# Patient Record
Sex: FEMALE | Race: WHITE | NOT HISPANIC OR LATINO | Employment: FULL TIME | RURAL
[De-identification: names, ages, dates, MRNs, and addresses within clinical notes are randomized per-mention and may not be internally consistent; named-entity substitution may affect disease eponyms.]

---

## 2018-01-02 ENCOUNTER — HISTORICAL (OUTPATIENT)
Dept: ADMINISTRATIVE | Facility: HOSPITAL | Age: 23
End: 2018-01-02

## 2018-01-05 LAB
LAB AP CLINICAL INFORMATION: NORMAL
LAB AP GENERAL CAT - HISTORICAL: NORMAL
LAB AP INTERPRETATION/RESULT - HISTORICAL: NEGATIVE
LAB AP SPECIMEN ADEQUACY - HISTORICAL: NORMAL
LAB AP SPECIMEN SUBMITTED - HISTORICAL: NORMAL

## 2018-08-09 ENCOUNTER — HISTORICAL (OUTPATIENT)
Dept: ADMINISTRATIVE | Facility: HOSPITAL | Age: 23
End: 2018-08-09

## 2018-08-13 LAB
LAB AP GENERAL CAT - HISTORICAL: NORMAL
LAB AP INTERPRETATION/RESULT - HISTORICAL: NEGATIVE
LAB AP SPECIMEN ADEQUACY - HISTORICAL: NORMAL
LAB AP SPECIMEN SUBMITTED - HISTORICAL: NORMAL

## 2020-09-25 ENCOUNTER — HISTORICAL (OUTPATIENT)
Dept: ADMINISTRATIVE | Facility: HOSPITAL | Age: 25
End: 2020-09-25

## 2020-11-03 ENCOUNTER — HISTORICAL (OUTPATIENT)
Dept: ADMINISTRATIVE | Facility: HOSPITAL | Age: 25
End: 2020-11-03

## 2022-01-31 ENCOUNTER — OFFICE VISIT (OUTPATIENT)
Dept: FAMILY MEDICINE | Facility: CLINIC | Age: 27
End: 2022-01-31

## 2022-01-31 VITALS
WEIGHT: 110 LBS | BODY MASS INDEX: 20.77 KG/M2 | OXYGEN SATURATION: 97 % | HEIGHT: 61 IN | HEART RATE: 105 BPM | TEMPERATURE: 99 F

## 2022-01-31 DIAGNOSIS — R50.9 FEVER WITH EXPOSURE TO COVID-19 VIRUS: Primary | ICD-10-CM

## 2022-01-31 DIAGNOSIS — Z20.822 FEVER WITH EXPOSURE TO COVID-19 VIRUS: Primary | ICD-10-CM

## 2022-01-31 LAB
CTP QC/QA: YES
FLUAV AG NPH QL: NEGATIVE
FLUBV AG NPH QL: NEGATIVE
SARS-COV-2 AG RESP QL IA.RAPID: NEGATIVE

## 2022-01-31 PROCEDURE — 99203 PR OFFICE/OUTPT VISIT, NEW, LEVL III, 30-44 MIN: ICD-10-PCS | Mod: GC,,, | Performed by: SPECIALIST

## 2022-01-31 PROCEDURE — 99203 OFFICE O/P NEW LOW 30 MIN: CPT | Mod: GC,,, | Performed by: SPECIALIST

## 2022-01-31 PROCEDURE — 87428 SARSCOV & INF VIR A&B AG IA: CPT | Mod: QW,GC,, | Performed by: SPECIALIST

## 2022-01-31 PROCEDURE — 87428 POCT SARS-COV2 (COVID) WITH FLU ANTIGEN: ICD-10-PCS | Mod: QW,GC,, | Performed by: SPECIALIST

## 2022-01-31 NOTE — PROGRESS NOTES
"  Subjective:       Patient ID: Malinda Sepulveda is a 26 y.o. female.    Chief Complaint: No chief complaint on file.      26 y.o. patient who presents to [facility] for COVID-19 testing. Patient is symptomatic since yesterday, 1/30/22. Symptoms include fever, congestion, sore throat and headaches. Patient has no known exposure. . Patient vapes. Patient denies significant medical history. Patient has not received the COVID-19 vaccine. Patient has been previously diagnosed with COVID-19, August 2021.    Review of Systems   Constitutional: Positive for fatigue. Negative for fever.   HENT: Positive for nasal congestion and sore throat.    Respiratory: Negative for cough and shortness of breath.    Cardiovascular: Negative for chest pain.   Gastrointestinal: Negative for abdominal pain, diarrhea, nausea and vomiting.   Genitourinary: Negative for dysuria.   Musculoskeletal: Negative for myalgias.   Integumentary:  Negative for rash.   Neurological: Positive for headaches. Negative for dizziness and weakness.   Psychiatric/Behavioral: Negative for confusion.   All other systems reviewed and are negative.          Objective:        Pulse 105   Temp 98.8 °F (37.1 °C)   Ht 5' 1" (1.549 m)   Wt 49.9 kg (110 lb)   SpO2 97%   BMI 20.78 kg/m²     Physical Exam  Constitutional:       General: She is not in acute distress.     Appearance: Normal appearance. She is not ill-appearing.   HENT:      Head: Normocephalic and atraumatic.      Nose: Nose normal.   Eyes:      Conjunctiva/sclera: Conjunctivae normal.      Pupils: Pupils are equal, round, and reactive to light.   Pulmonary:      Effort: Pulmonary effort is normal. No respiratory distress.   Musculoskeletal:      Cervical back: No rigidity.   Skin:     Coloration: Skin is not jaundiced or pale.      Findings: No rash.   Neurological:      Mental Status: She is alert.   Psychiatric:         Behavior: Behavior normal.         Thought Content: Thought content normal.         "   Assessment:       1. Fever with exposure to COVID-19 virus        Plan:         Problem List Items Addressed This Visit        Other    Fever with exposure to COVID-19 virus - Primary     Instructions given for isolation, home care, and monitoring.            Relevant Orders    POCT SARS-COV2 (COVID) with Flu Antigen           Follow up if symptoms worsen or fail to improve.

## 2023-04-05 ENCOUNTER — OFFICE VISIT (OUTPATIENT)
Dept: FAMILY MEDICINE | Facility: CLINIC | Age: 28
End: 2023-04-05
Payer: COMMERCIAL

## 2023-04-05 VITALS
OXYGEN SATURATION: 96 % | SYSTOLIC BLOOD PRESSURE: 110 MMHG | HEART RATE: 54 BPM | DIASTOLIC BLOOD PRESSURE: 74 MMHG | RESPIRATION RATE: 18 BRPM | HEIGHT: 61 IN | TEMPERATURE: 98 F | BODY MASS INDEX: 20.82 KG/M2 | WEIGHT: 110.25 LBS

## 2023-04-05 DIAGNOSIS — H92.09 OTALGIA, UNSPECIFIED LATERALITY: Primary | ICD-10-CM

## 2023-04-05 PROCEDURE — 1159F MED LIST DOCD IN RCRD: CPT | Mod: CPTII,,, | Performed by: NURSE PRACTITIONER

## 2023-04-05 PROCEDURE — 1159F PR MEDICATION LIST DOCUMENTED IN MEDICAL RECORD: ICD-10-PCS | Mod: CPTII,,, | Performed by: NURSE PRACTITIONER

## 2023-04-05 PROCEDURE — 1160F RVW MEDS BY RX/DR IN RCRD: CPT | Mod: CPTII,,, | Performed by: NURSE PRACTITIONER

## 2023-04-05 PROCEDURE — 99203 PR OFFICE/OUTPT VISIT, NEW, LEVL III, 30-44 MIN: ICD-10-PCS | Mod: ,,, | Performed by: NURSE PRACTITIONER

## 2023-04-05 PROCEDURE — 3008F PR BODY MASS INDEX (BMI) DOCUMENTED: ICD-10-PCS | Mod: CPTII,,, | Performed by: NURSE PRACTITIONER

## 2023-04-05 PROCEDURE — 1160F PR REVIEW ALL MEDS BY PRESCRIBER/CLIN PHARMACIST DOCUMENTED: ICD-10-PCS | Mod: CPTII,,, | Performed by: NURSE PRACTITIONER

## 2023-04-05 PROCEDURE — 3074F SYST BP LT 130 MM HG: CPT | Mod: CPTII,,, | Performed by: NURSE PRACTITIONER

## 2023-04-05 PROCEDURE — 3074F PR MOST RECENT SYSTOLIC BLOOD PRESSURE < 130 MM HG: ICD-10-PCS | Mod: CPTII,,, | Performed by: NURSE PRACTITIONER

## 2023-04-05 PROCEDURE — 99203 OFFICE O/P NEW LOW 30 MIN: CPT | Mod: ,,, | Performed by: NURSE PRACTITIONER

## 2023-04-05 PROCEDURE — 3078F DIAST BP <80 MM HG: CPT | Mod: CPTII,,, | Performed by: NURSE PRACTITIONER

## 2023-04-05 PROCEDURE — 3008F BODY MASS INDEX DOCD: CPT | Mod: CPTII,,, | Performed by: NURSE PRACTITIONER

## 2023-04-05 PROCEDURE — 3078F PR MOST RECENT DIASTOLIC BLOOD PRESSURE < 80 MM HG: ICD-10-PCS | Mod: CPTII,,, | Performed by: NURSE PRACTITIONER

## 2023-04-05 RX ORDER — IBUPROFEN 600 MG/1
600 TABLET ORAL 3 TIMES DAILY
Qty: 30 TABLET | Refills: 1 | Status: SHIPPED | OUTPATIENT
Start: 2023-04-05 | End: 2023-10-04

## 2023-04-05 RX ORDER — CLOBETASOL PROPIONATE 0.5 MG/G
AEROSOL, FOAM TOPICAL
COMMUNITY
Start: 2022-11-29

## 2023-04-05 RX ORDER — KETOCONAZOLE 20 MG/ML
SHAMPOO, SUSPENSION TOPICAL
COMMUNITY
Start: 2023-01-17 | End: 2023-11-08

## 2023-04-05 NOTE — LETTER
April 5, 2023      [facility]  [address] [place] [address]  Phone: [phone]  Fax: [phone]       Patient: Malinda Sepulveda   YOB: 1995  Date of Visit: 04/05/2023    To Whom It May Concern:    Victoriano Sepulveda  was at [facility] on 04/05/2023. The patient may return to work/school on 4/5/2023 with no restrictions. If you have any questions or concerns, or if I can be of further assistance, please do not hesitate to contact me.    Sincerely,    THAD Disla

## 2023-04-05 NOTE — PROGRESS NOTES
Emely Hanson, THAD   [facility]      PATIENT NAME: Malinda Sepulveda  : 1995  DATE: 23  MRN: [id number]      Patient PCP Information       Provider PCP Type    Primary Doctor No General            Reason for Visit / Chief Complaint: Otalgia (Right/Room 1///)           History of Present Illness / Problem Focused Workflow     Malinda Sepulveda presents to the clinic with Otalgia (Right/Room 1///)     HPI  Patient with right ear pain for last year. Intermittent. Has become more frequent.   Patient states sound muffled at times. Pain aching and sharp in ear. No ringing in ear.   Hx of softball injury as chid with jaw fracture. Plate midline  mandible per patient. Was hit on right side.   No known hx of tmj    Review of Systems     Review of Systems   Constitutional:  Negative for activity change, appetite change, chills, diaphoresis, fatigue, fever and unexpected weight change.   HENT:  Positive for ear pain. Negative for congestion, facial swelling, hearing loss, nosebleeds and sore throat.    Eyes: Negative.    Respiratory:  Negative for apnea, cough, shortness of breath and wheezing.    Cardiovascular:  Negative for chest pain, palpitations and leg swelling.   Gastrointestinal:  Negative for abdominal distention, abdominal pain, blood in stool, constipation, diarrhea and nausea.   Endocrine: Negative for cold intolerance, heat intolerance, polydipsia, polyphagia and polyuria.   Genitourinary:  Negative for decreased urine volume, difficulty urinating, dysuria, flank pain, frequency, hematuria and urgency.   Musculoskeletal:  Negative for arthralgias, joint swelling and myalgias.   Skin:  Negative for color change and rash.   Allergic/Immunologic: Negative.    Neurological:  Negative for dizziness, tremors, seizures, syncope, facial asymmetry, speech difficulty, weakness, light-headedness, numbness and headaches.   Hematological:  Negative for adenopathy. Does not bruise/bleed easily.  "  Psychiatric/Behavioral:  Negative for behavioral problems and confusion.      Medical / Social / Family History   History reviewed. No pertinent past medical history.    History reviewed. No pertinent surgical history.    Social History  Ms.  reports that she has been smoking vaping with nicotine. She has never used smokeless tobacco. She reports current alcohol use. She reports that she does not use drugs.    Family History  Ms.'s family history is not on file.    Medications and Allergies     Medications  Outpatient Medications Marked as Taking for the 4/5/23 encounter (Office Visit) with THAD Disla   Medication Sig Dispense Refill    clobetasoL (OLUX) 0.05 % Foam Apply topically.      ketoconazole (NIZORAL) 2 % shampoo SMARTSIG:Topical 2-3 Times Weekly         Allergies  Review of patient's allergies indicates:  No Known Allergies    Physical Examination     Vitals:    04/05/23 0944   BP: 110/74   BP Location: Right arm   Patient Position: Sitting   Pulse: (!) 54   Resp: 18   Temp: 98.1 °F (36.7 °C)   SpO2: 96%   Weight: 50 kg (110 lb 4 oz)   Height: 5' 1" (1.549 m)       Physical Exam  Vitals and nursing note reviewed.   Constitutional:       Appearance: Normal appearance. She is normal weight.   HENT:      Head: Normocephalic.      Right Ear: Tympanic membrane, ear canal and external ear normal.      Left Ear: Tympanic membrane, ear canal and external ear normal.      Ears:      Comments: Mild crepitus at TMJ with open and closing mouth     Nose: Nose normal.      Mouth/Throat:      Mouth: Mucous membranes are moist.   Eyes:      Extraocular Movements: Extraocular movements intact.      Conjunctiva/sclera: Conjunctivae normal.      Pupils: Pupils are equal, round, and reactive to light.   Cardiovascular:      Rate and Rhythm: Normal rate and regular rhythm.      Pulses: Normal pulses.      Heart sounds: Normal heart sounds. No murmur heard.  Pulmonary:      Effort: Pulmonary effort is normal.      " Breath sounds: Normal breath sounds. No stridor. No wheezing or rhonchi.   Abdominal:      General: Bowel sounds are normal. There is no distension.      Palpations: Abdomen is soft. There is no mass.      Tenderness: There is no abdominal tenderness.   Musculoskeletal:         General: No swelling or tenderness. Normal range of motion.      Cervical back: Normal range of motion and neck supple.      Right lower leg: No edema.      Left lower leg: No edema.   Skin:     General: Skin is warm and dry.      Capillary Refill: Capillary refill takes less than 2 seconds.   Neurological:      General: No focal deficit present.      Mental Status: She is alert. Mental status is at baseline.      Cranial Nerves: No cranial nerve deficit.      Sensory: No sensory deficit.      Motor: No weakness.   Psychiatric:         Mood and Affect: Mood normal.         Behavior: Behavior normal.         Thought Content: Thought content normal.         Judgment: Judgment normal.         No visits with results within 14 Day(s) from this visit.   Latest known visit with results is:   Office Visit on 01/31/2022   Component Date Value Ref Range Status    SARS Coronavirus 2 Antigen 01/31/2022 Negative  Negative Final    Rapid Influenza A Ag 01/31/2022 Negative  Negative Final    Rapid Influenza B Ag 01/31/2022 Negative  Negative Final     Acceptable 01/31/2022 Yes   Final             Assessment and Plan (including Health Maintenance)       Plan:   Otalgia, unspecified laterality  -     ibuprofen (ADVIL,MOTRIN) 600 MG tablet; Take 1 tablet (600 mg total) by mouth 3 (three) times daily.  Dispense: 30 tablet; Refill: 1     Possible TMJ  Take nsaid as prescribed , follow up 1 mo for recheck  There are no Patient Instructions on file for this visit.       Health Maintenance Due   Topic Date Due    Hepatitis C Screening  Never done    Lipid Panel  Never done    COVID-19 Vaccine (1) Never done    Pneumococcal Vaccines (Age 0-64) (1 -  PCV) Never done    HIV Screening  Never done    TETANUS VACCINE  Never done    Pap Smear  Never done    Influenza Vaccine (1) Never done         There is no immunization history on file for this patient.     Problem List Items Addressed This Visit    None  Visit Diagnoses       Otalgia, unspecified laterality    -  Primary    Relevant Medications    ibuprofen (ADVIL,MOTRIN) 600 MG tablet            The patient has no Health Maintenance topics of status Not Due    Future Appointments   Date Time Provider Department Center   5/5/2023  7:45 AM THAD Disla [facility] G[facility]            Signature:  THAD Disla  [facility]     Date of encounter: 4/5/23

## 2023-05-05 ENCOUNTER — OFFICE VISIT (OUTPATIENT)
Dept: FAMILY MEDICINE | Facility: CLINIC | Age: 28
End: 2023-05-05
Payer: COMMERCIAL

## 2023-05-05 VITALS
TEMPERATURE: 99 F | RESPIRATION RATE: 20 BRPM | DIASTOLIC BLOOD PRESSURE: 82 MMHG | HEIGHT: 61 IN | WEIGHT: 120 LBS | SYSTOLIC BLOOD PRESSURE: 123 MMHG | BODY MASS INDEX: 22.66 KG/M2 | HEART RATE: 84 BPM

## 2023-05-05 DIAGNOSIS — R05.9 COUGH, UNSPECIFIED TYPE: Primary | ICD-10-CM

## 2023-05-05 DIAGNOSIS — Z87.09 HISTORY OF STREP SORE THROAT: ICD-10-CM

## 2023-05-05 PROCEDURE — 3008F BODY MASS INDEX DOCD: CPT | Mod: CPTII,,, | Performed by: NURSE PRACTITIONER

## 2023-05-05 PROCEDURE — 99212 PR OFFICE/OUTPT VISIT, EST, LEVL II, 10-19 MIN: ICD-10-PCS | Mod: ,,, | Performed by: NURSE PRACTITIONER

## 2023-05-05 PROCEDURE — 3008F PR BODY MASS INDEX (BMI) DOCUMENTED: ICD-10-PCS | Mod: CPTII,,, | Performed by: NURSE PRACTITIONER

## 2023-05-05 PROCEDURE — 1160F PR REVIEW ALL MEDS BY PRESCRIBER/CLIN PHARMACIST DOCUMENTED: ICD-10-PCS | Mod: CPTII,,, | Performed by: NURSE PRACTITIONER

## 2023-05-05 PROCEDURE — 3074F PR MOST RECENT SYSTOLIC BLOOD PRESSURE < 130 MM HG: ICD-10-PCS | Mod: CPTII,,, | Performed by: NURSE PRACTITIONER

## 2023-05-05 PROCEDURE — 99212 OFFICE O/P EST SF 10 MIN: CPT | Mod: ,,, | Performed by: NURSE PRACTITIONER

## 2023-05-05 PROCEDURE — 3079F PR MOST RECENT DIASTOLIC BLOOD PRESSURE 80-89 MM HG: ICD-10-PCS | Mod: CPTII,,, | Performed by: NURSE PRACTITIONER

## 2023-05-05 PROCEDURE — 1160F RVW MEDS BY RX/DR IN RCRD: CPT | Mod: CPTII,,, | Performed by: NURSE PRACTITIONER

## 2023-05-05 PROCEDURE — 87081 CULTURE, STREP A,  THROAT: ICD-10-PCS | Mod: ,,, | Performed by: CLINICAL MEDICAL LABORATORY

## 2023-05-05 PROCEDURE — 1159F PR MEDICATION LIST DOCUMENTED IN MEDICAL RECORD: ICD-10-PCS | Mod: CPTII,,, | Performed by: NURSE PRACTITIONER

## 2023-05-05 PROCEDURE — 1159F MED LIST DOCD IN RCRD: CPT | Mod: CPTII,,, | Performed by: NURSE PRACTITIONER

## 2023-05-05 PROCEDURE — 3074F SYST BP LT 130 MM HG: CPT | Mod: CPTII,,, | Performed by: NURSE PRACTITIONER

## 2023-05-05 PROCEDURE — 3079F DIAST BP 80-89 MM HG: CPT | Mod: CPTII,,, | Performed by: NURSE PRACTITIONER

## 2023-05-05 PROCEDURE — 87081 CULTURE SCREEN ONLY: CPT | Mod: ,,, | Performed by: CLINICAL MEDICAL LABORATORY

## 2023-05-05 NOTE — PROGRESS NOTES
THAD Disla   [facility]      PATIENT NAME: Malinda Sepulveda  : 1995  DATE: 23  MRN: [id number]      Patient PCP Information       Provider PCP Type    Primary Doctor No General            Reason for Visit / Chief Complaint: Follow-up (Patient presents to the clinic 1m F/U just got over strep and still having a sore throat and coughing/RM2)      History of Present Illness / Problem Focused Workflow     Malinda Sepulveda presents to the clinic with Follow-up (Patient presents to the clinic 1m F/U just got over strep and still having a sore throat and coughing/RM2)     Follow-up  Pertinent negatives include no abdominal pain, arthralgias, chest pain, chills, congestion, coughing, diaphoresis, fatigue, fever, headaches, joint swelling, myalgias, nausea, numbness, rash, sore throat or weakness.   Patient presents to clinic with continue sore throat and coughing. Patient reports strep  treated with azithromycin. Patient states posterior pharynx pain improved.   However patient awoke with pain in throat lower near base of neck and cough. Patient is taking pepcid for reflux.   Patient denies fever. Patient was scheduled to follow up for ear pain as well. Prev treated with motrin for suspected TMJ. Patient does report improvement in pain with motrin.     Review of Systems     Review of Systems   Constitutional:  Negative for activity change, appetite change, chills, diaphoresis, fatigue, fever and unexpected weight change.   HENT:  Negative for congestion, ear pain, facial swelling, hearing loss, nosebleeds and sore throat.    Eyes: Negative.    Respiratory:  Negative for apnea, cough, shortness of breath and wheezing.    Cardiovascular:  Negative for chest pain, palpitations and leg swelling.   Gastrointestinal:  Negative for abdominal distention, abdominal pain, blood in stool, constipation, diarrhea and nausea.   Endocrine: Negative for cold intolerance, heat intolerance, polydipsia,  "polyphagia and polyuria.   Genitourinary:  Negative for decreased urine volume, difficulty urinating, dysuria, flank pain, frequency, hematuria and urgency.   Musculoskeletal:  Negative for arthralgias, joint swelling and myalgias.   Skin:  Negative for color change and rash.   Allergic/Immunologic: Negative.    Neurological:  Negative for dizziness, tremors, seizures, syncope, facial asymmetry, speech difficulty, weakness, light-headedness, numbness and headaches.   Hematological:  Negative for adenopathy. Does not bruise/bleed easily.   Psychiatric/Behavioral:  Negative for behavioral problems and confusion.      Medical / Social / Family History   History reviewed. No pertinent past medical history.    Past Surgical History:   Procedure Laterality Date    MANDIBLE FRACTURE SURGERY         Social History  Ms.  reports that she has been smoking vaping with nicotine. She has never used smokeless tobacco. She reports current alcohol use. She reports that she does not use drugs.    Family History  Ms.'s family history includes Cancer in her maternal grandfather; Heart failure in her maternal grandmother; Stroke in her maternal grandmother.    Medications and Allergies     Medications  No outpatient medications have been marked as taking for the 5/5/23 encounter (Office Visit) with THAD Disla.       Allergies  Review of patient's allergies indicates:  No Known Allergies    Physical Examination     Vitals:    05/05/23 0750   BP: 123/82   BP Location: Right arm   Patient Position: Sitting   BP Method: Small (Automatic)   Pulse: 84   Resp: 20   Temp: 98.5 °F (36.9 °C)   TempSrc: Oral   Weight: 54.4 kg (120 lb)   Height: 5' 1" (1.549 m)       Physical Exam  Vitals and nursing note reviewed.   Constitutional:       Appearance: Normal appearance. She is normal weight.   HENT:      Head: Normocephalic.      Right Ear: External ear normal.      Left Ear: External ear normal.      Nose: Nose normal.      " Mouth/Throat:      Mouth: Mucous membranes are moist.      Comments: Mild posterior pharynx erythema  Eyes:      Extraocular Movements: Extraocular movements intact.      Conjunctiva/sclera: Conjunctivae normal.      Pupils: Pupils are equal, round, and reactive to light.   Cardiovascular:      Rate and Rhythm: Normal rate and regular rhythm.      Pulses: Normal pulses.      Heart sounds: Normal heart sounds. No murmur heard.  Pulmonary:      Effort: Pulmonary effort is normal.      Breath sounds: Normal breath sounds. No stridor. No wheezing or rhonchi.   Abdominal:      General: Bowel sounds are normal. There is no distension.      Palpations: Abdomen is soft. There is no mass.      Tenderness: There is no abdominal tenderness.   Musculoskeletal:         General: No swelling or tenderness. Normal range of motion.      Cervical back: Normal range of motion and neck supple.      Right lower leg: No edema.      Left lower leg: No edema.   Skin:     General: Skin is warm and dry.      Capillary Refill: Capillary refill takes less than 2 seconds.   Neurological:      General: No focal deficit present.      Mental Status: She is alert and oriented to person, place, and time. Mental status is at baseline.      Cranial Nerves: No cranial nerve deficit.      Sensory: No sensory deficit.      Motor: No weakness.   Psychiatric:         Mood and Affect: Mood normal.         Behavior: Behavior normal.         Thought Content: Thought content normal.         Judgment: Judgment normal.         No visits with results within 14 Day(s) from this visit.   Latest known visit with results is:   Office Visit on 01/31/2022   Component Date Value Ref Range Status    SARS Coronavirus 2 Antigen 01/31/2022 Negative  Negative Final    Rapid Influenza A Ag 01/31/2022 Negative  Negative Final    Rapid Influenza B Ag 01/31/2022 Negative  Negative Final     Acceptable 01/31/2022 Yes   Final             Assessment and Plan  (including Health Maintenance)       Plan:   Cough, unspecified type        -   patient has tessalon at home taking prn    History of strep sore throat  -     Strep A culture, throat; Future; Expected date: 05/05/2023     Continue to monitor symptoms.    Follow up with strep culture to evaluate for clearance of infection.   RTC if symptoms worsen or fail to improve  Continue ppi for reflux  There are no Patient Instructions on file for this visit.       Health Maintenance Due   Topic Date Due    Hepatitis C Screening  Never done    Lipid Panel  Never done    COVID-19 Vaccine (1) Never done    Pneumococcal Vaccines (Age 0-64) (1 - PCV) Never done    HIV Screening  Never done    TETANUS VACCINE  Never done    Pap Smear  Never done         There is no immunization history on file for this patient.     Problem List Items Addressed This Visit    None  Visit Diagnoses       Cough, unspecified type    -  Primary    History of strep sore throat        Relevant Orders    Strep A culture, throat            Health Maintenance Topics with due status: Not Due       Topic Last Completion Date    Influenza Vaccine Not Due       No future appointments.         Signature:  THAD Disla [facility]     Date of encounter: 5/5/23

## 2023-05-07 LAB — DEPRECATED S PYO AG THROAT QL EIA: NORMAL

## 2023-09-25 ENCOUNTER — OFFICE VISIT (OUTPATIENT)
Dept: FAMILY MEDICINE | Facility: CLINIC | Age: 28
End: 2023-09-25
Payer: COMMERCIAL

## 2023-09-25 VITALS
TEMPERATURE: 98 F | HEART RATE: 50 BPM | HEIGHT: 61 IN | SYSTOLIC BLOOD PRESSURE: 108 MMHG | WEIGHT: 107.5 LBS | BODY MASS INDEX: 20.3 KG/M2 | DIASTOLIC BLOOD PRESSURE: 60 MMHG | OXYGEN SATURATION: 98 % | RESPIRATION RATE: 16 BRPM

## 2023-09-25 DIAGNOSIS — R55 NEAR SYNCOPE: Primary | ICD-10-CM

## 2023-09-25 DIAGNOSIS — N92.6 IRREGULAR MENSES: ICD-10-CM

## 2023-09-25 DIAGNOSIS — Z86.2 HISTORY OF ANEMIA: ICD-10-CM

## 2023-09-25 DIAGNOSIS — R61 DIAPHORESIS: ICD-10-CM

## 2023-09-25 LAB
B-HCG UR QL: NEGATIVE
CTP QC/QA: YES
CTP QC/QA: YES
POC (AMP) AMPHETAMINE: NEGATIVE
POC (BAR) BARBITURATES: NEGATIVE
POC (BUP) BUPRENORPHINE: NEGATIVE
POC (BZO) BENZODIAZEPINES: NEGATIVE
POC (COC) COCAINE: NEGATIVE
POC (MDMA) METHYLENEDIOXYMETHAMPHETAMINE 3,4: NEGATIVE
POC (MET) METHAMPHETAMINE: NEGATIVE
POC (MOP) OPIATES: NEGATIVE
POC (MTD) METHADONE: NEGATIVE
POC (OXY) OXYCODONE: NEGATIVE
POC (PCP) PHENCYCLIDINE: NEGATIVE
POC (TCA) TRICYCLIC ANTIDEPRESSANTS: NEGATIVE
POC TEMPERATURE (URINE): 94
POC THC: NEGATIVE

## 2023-09-25 PROCEDURE — 99214 OFFICE O/P EST MOD 30 MIN: CPT | Mod: ,,, | Performed by: NURSE PRACTITIONER

## 2023-09-25 PROCEDURE — 80305 DRUG TEST PRSMV DIR OPT OBS: CPT | Mod: QW,,, | Performed by: NURSE PRACTITIONER

## 2023-09-25 PROCEDURE — 3078F PR MOST RECENT DIASTOLIC BLOOD PRESSURE < 80 MM HG: ICD-10-PCS | Mod: CPTII,,, | Performed by: NURSE PRACTITIONER

## 2023-09-25 PROCEDURE — 3074F PR MOST RECENT SYSTOLIC BLOOD PRESSURE < 130 MM HG: ICD-10-PCS | Mod: CPTII,,, | Performed by: NURSE PRACTITIONER

## 2023-09-25 PROCEDURE — 81025 URINE PREGNANCY TEST: CPT | Mod: QW,,, | Performed by: NURSE PRACTITIONER

## 2023-09-25 PROCEDURE — 81025 POCT URINE PREGNANCY: ICD-10-PCS | Mod: QW,,, | Performed by: NURSE PRACTITIONER

## 2023-09-25 PROCEDURE — 3008F PR BODY MASS INDEX (BMI) DOCUMENTED: ICD-10-PCS | Mod: CPTII,,, | Performed by: NURSE PRACTITIONER

## 2023-09-25 PROCEDURE — 1160F PR REVIEW ALL MEDS BY PRESCRIBER/CLIN PHARMACIST DOCUMENTED: ICD-10-PCS | Mod: CPTII,,, | Performed by: NURSE PRACTITIONER

## 2023-09-25 PROCEDURE — 85025 CBC WITH DIFFERENTIAL: ICD-10-PCS | Mod: ,,, | Performed by: CLINICAL MEDICAL LABORATORY

## 2023-09-25 PROCEDURE — 3008F BODY MASS INDEX DOCD: CPT | Mod: CPTII,,, | Performed by: NURSE PRACTITIONER

## 2023-09-25 PROCEDURE — 3074F SYST BP LT 130 MM HG: CPT | Mod: CPTII,,, | Performed by: NURSE PRACTITIONER

## 2023-09-25 PROCEDURE — 3078F DIAST BP <80 MM HG: CPT | Mod: CPTII,,, | Performed by: NURSE PRACTITIONER

## 2023-09-25 PROCEDURE — 1160F RVW MEDS BY RX/DR IN RCRD: CPT | Mod: CPTII,,, | Performed by: NURSE PRACTITIONER

## 2023-09-25 PROCEDURE — 1159F PR MEDICATION LIST DOCUMENTED IN MEDICAL RECORD: ICD-10-PCS | Mod: CPTII,,, | Performed by: NURSE PRACTITIONER

## 2023-09-25 PROCEDURE — 85025 COMPLETE CBC W/AUTO DIFF WBC: CPT | Mod: ,,, | Performed by: CLINICAL MEDICAL LABORATORY

## 2023-09-25 PROCEDURE — 99214 PR OFFICE/OUTPT VISIT, EST, LEVL IV, 30-39 MIN: ICD-10-PCS | Mod: ,,, | Performed by: NURSE PRACTITIONER

## 2023-09-25 PROCEDURE — 36415 PR COLLECTION VENOUS BLOOD,VENIPUNCTURE: ICD-10-PCS | Mod: ,,, | Performed by: NURSE PRACTITIONER

## 2023-09-25 PROCEDURE — 80305 POCT URINE DRUG SCREEN PRESUMP: ICD-10-PCS | Mod: QW,,, | Performed by: NURSE PRACTITIONER

## 2023-09-25 PROCEDURE — 36415 COLL VENOUS BLD VENIPUNCTURE: CPT | Mod: ,,, | Performed by: NURSE PRACTITIONER

## 2023-09-25 PROCEDURE — 1159F MED LIST DOCD IN RCRD: CPT | Mod: CPTII,,, | Performed by: NURSE PRACTITIONER

## 2023-09-26 ENCOUNTER — PATIENT MESSAGE (OUTPATIENT)
Dept: FAMILY MEDICINE | Facility: CLINIC | Age: 28
End: 2023-09-26
Payer: COMMERCIAL

## 2023-09-26 PROBLEM — R55 NEAR SYNCOPE: Status: ACTIVE | Noted: 2023-09-26

## 2023-09-26 PROBLEM — R50.9 FEVER WITH EXPOSURE TO COVID-19 VIRUS: Status: RESOLVED | Noted: 2022-01-31 | Resolved: 2023-09-26

## 2023-09-26 PROBLEM — R61 DIAPHORESIS: Status: ACTIVE | Noted: 2023-09-26

## 2023-09-26 PROBLEM — N92.6 IRREGULAR MENSES: Status: ACTIVE | Noted: 2023-09-26

## 2023-09-26 PROBLEM — Z20.822 FEVER WITH EXPOSURE TO COVID-19 VIRUS: Status: RESOLVED | Noted: 2022-01-31 | Resolved: 2023-09-26

## 2023-09-26 PROBLEM — Z86.2 HISTORY OF ANEMIA: Status: ACTIVE | Noted: 2023-09-26

## 2023-09-26 LAB
BASOPHILS # BLD AUTO: 0.04 K/UL (ref 0–0.2)
BASOPHILS NFR BLD AUTO: 0.6 % (ref 0–1)
DIFFERENTIAL METHOD BLD: ABNORMAL
EOSINOPHIL # BLD AUTO: 0.04 K/UL (ref 0–0.5)
EOSINOPHIL NFR BLD AUTO: 0.6 % (ref 1–4)
ERYTHROCYTE [DISTWIDTH] IN BLOOD BY AUTOMATED COUNT: 11.7 % (ref 11.5–14.5)
HCT VFR BLD AUTO: 41.8 % (ref 38–47)
HGB BLD-MCNC: 14.3 G/DL (ref 12–16)
IMM GRANULOCYTES # BLD AUTO: 0.02 K/UL (ref 0–0.04)
IMM GRANULOCYTES NFR BLD: 0.3 % (ref 0–0.4)
LYMPHOCYTES # BLD AUTO: 0.8 K/UL (ref 1–4.8)
LYMPHOCYTES NFR BLD AUTO: 11 % (ref 27–41)
MCH RBC QN AUTO: 31.5 PG (ref 27–31)
MCHC RBC AUTO-ENTMCNC: 34.2 G/DL (ref 32–36)
MCV RBC AUTO: 92.1 FL (ref 80–96)
MONOCYTES # BLD AUTO: 0.39 K/UL (ref 0–0.8)
MONOCYTES NFR BLD AUTO: 5.4 % (ref 2–6)
MPC BLD CALC-MCNC: 12.2 FL (ref 9.4–12.4)
NEUTROPHILS # BLD AUTO: 5.98 K/UL (ref 1.8–7.7)
NEUTROPHILS NFR BLD AUTO: 82.1 % (ref 53–65)
NRBC # BLD AUTO: 0 X10E3/UL
NRBC, AUTO (.00): 0 %
PLATELET # BLD AUTO: 203 K/UL (ref 150–400)
RBC # BLD AUTO: 4.54 M/UL (ref 4.2–5.4)
WBC # BLD AUTO: 7.27 K/UL (ref 4.5–11)

## 2023-09-26 NOTE — ASSESSMENT & PLAN NOTE
Reports pregnancy associated anemia but admits that she is not good about keeping doctor appts to keep check on this.

## 2023-09-26 NOTE — PROGRESS NOTES
THAD Cobb   [facility]  [address] [facility] [address]  [phone]      PATIENT NAME: Malinda Sepulveda  : 1995  DATE: 23  MRN: [id number]      Billing Provider: THAD Cobb  Level of Service: [place] OFFICE/OUTPT VISIT, EST, LEVL IV, 30-39 MIN  Patient PCP Information       Provider PCP Type    Primary Doctor No General            Reason for Visit / Chief Complaint: Nausea and Blurred Vision (C/O a episode this afternoon while driving,she felt nauseous and her vision blurred(lasted about 2 minutes))       Update PCP  Update Chief Complaint         History of Present Illness / Problem Focused Workflow     Malinda Sepulveda presents to the clinic with Nausea and Blurred Vision (C/O a episode this afternoon while driving,she felt nauseous and her vision blurred(lasted about 2 minutes))     Patient presents today for c/o diaphoresis and blurry vision that occurred just prior to arrival. She believes it may be related to eating a sugary snack just prior to event. She reports that she felt like her eyes were heavy and blurry when the event occurred and she had some diaphoresis. She reports that she has a history of anemia during pregnancy. Reports skipped menstrual cycle last month but had cycle last week that was heavy in nature. She did not try any relief measures when the event occurred and reports feeling near normal at this time.     Nausea  Associated symptoms include nausea. Pertinent negatives include no abdominal pain, arthralgias, change in bowel habit, coughing, fatigue, sore throat, vomiting or weakness.       Review of Systems     Review of Systems   Constitutional:  Negative for fatigue and unexpected weight change.   HENT:  Positive for rhinorrhea. Negative for ear pain and sore throat.    Respiratory:  Negative for cough.    Gastrointestinal:  Positive for nausea. Negative for abdominal distention, abdominal pain, blood in stool,  change in bowel habit, constipation, diarrhea, vomiting and reflux.        Nausea during event PTA that has resolved.    Endocrine: Negative for polydipsia and polyuria.   Genitourinary:  Positive for menstrual irregularity. Negative for dysuria, pelvic pain and vaginal discharge.   Musculoskeletal:  Negative for arthralgias and gait problem.   Neurological:  Negative for weakness.   Hematological:  Does not bruise/bleed easily.   Psychiatric/Behavioral:  Negative for sleep disturbance.         Denies depression  Denies anxiety        Medical / Social / Family History   History reviewed. No pertinent past medical history.    Past Surgical History:   Procedure Laterality Date    MANDIBLE FRACTURE SURGERY         Social History  Ms. Sepulveda  reports that she has been smoking vaping with nicotine. She has never used smokeless tobacco. She reports current alcohol use. She reports that she does not use drugs.    Family History  Ms. Sepulveda's family history includes Cancer in her maternal grandfather; Heart failure in her maternal grandmother; Stroke in her maternal grandmother.    Medications and Allergies     Medications  Outpatient Medications Marked as Taking for the 9/25/23 encounter (Office Visit) with Jolynn Quiñonez FNP   Medication Sig Dispense Refill    clobetasoL (OLUX) 0.05 % Foam Apply topically.      ketoconazole (NIZORAL) 2 % shampoo SMARTSIG:Topical 2-3 Times Weekly         Allergies  Review of patient's allergies indicates:  No Known Allergies    Physical Examination     Vitals:    09/25/23 1610   BP: 108/60   Pulse: (!) 50   Resp: 16   Temp: 98.1 °F (36.7 °C)     Physical Exam  Vitals and nursing note reviewed.   Constitutional:       General: She is not in acute distress.     Appearance: Normal appearance. She is normal weight. She is not ill-appearing, toxic-appearing or diaphoretic.   HENT:      Head: Normocephalic and atraumatic.      Mouth/Throat:      Mouth: Mucous membranes are moist.   Eyes:       "Conjunctiva/sclera: Conjunctivae normal.   Cardiovascular:      Rate and Rhythm: Normal rate and regular rhythm.      Pulses: Normal pulses.      Heart sounds: Normal heart sounds. No murmur heard.  Pulmonary:      Effort: Pulmonary effort is normal. No respiratory distress.      Breath sounds: Normal breath sounds.   Abdominal:      General: Abdomen is flat. Bowel sounds are normal.      Palpations: Abdomen is soft.   Musculoskeletal:         General: Normal range of motion.      Cervical back: Normal range of motion and neck supple.   Skin:     General: Skin is warm and dry.      Capillary Refill: Capillary refill takes 2 to 3 seconds.      Coloration: Skin is not jaundiced.   Neurological:      General: No focal deficit present.      Mental Status: She is alert and oriented to person, place, and time.      Cranial Nerves: No cranial nerve deficit.      Motor: No weakness.      Gait: Gait normal.      Comments: Ambulates without difficulty   Psychiatric:         Mood and Affect: Mood normal.         Behavior: Behavior normal.         Thought Content: Thought content normal.         Judgment: Judgment normal.          No results found for: "WBC", "HGB", "HCT", "MCV", "PLT"     No results found for: "NA", "K", "CL", "CO2", "GLU", "BUN", "CREATININE", "CALCIUM", "PROT", "ALBUMIN", "BILITOT", "ALKPHOS", "AST", "ALT", "ANIONGAP", "EGFRNORACEVR"   No results found for: "LABA1C", "HGBA1C"   No results found for: "CHOL"  No results found for: "HDL"  No results found for: "LDLCALC"  No results found for: "DLDL"  No results found for: "TRIG"  No results found for: "CHOLHDL"   No results found for: "TSH", "K1CLMEB", "U2ENMTA", "THYROIDAB", "FREET4"     Assessment and Plan (including Health Maintenance)      Problem List  Smart Sets  Document Outside HM   :    Plan:     1. Near syncope  Assessment & Plan:  Reports she had a spell just prior to arrival where she felt like she may pass out with blurry vision/tired eyes and " diaphoresis that has resolved. Reports that she ate sugar coated snack prior to event.     Orders:  -     CBC Auto Differential  -     Hemoglobin A1C; Future; Expected date: 09/25/2023  -     POCT Urine Drug Screen Presump  -     POCT urine pregnancy    2. Diaphoresis  -     CBC Auto Differential  -     Hemoglobin A1C; Future; Expected date: 09/25/2023  -     POCT Urine Drug Screen Presump  -     POCT urine pregnancy    3. History of anemia  Assessment & Plan:  Reports pregnancy associated anemia but admits that she is not good about keeping doctor appts to keep check on this.     Orders:  -     CBC Auto Differential    4. Irregular menses  Assessment & Plan:  Reports she missed a menstrual cycle last month but had a heavy cycle last week.            There are no Patient Instructions on file for this visit.     Health Maintenance Due   Topic Date Due    Hepatitis C Screening  Never done    Lipid Panel  Never done    COVID-19 Vaccine (1) Never done    Pneumococcal Vaccines (Age 0-64) (1 - PCV) Never done    HIV Screening  Never done    TETANUS VACCINE  Never done    Pap Smear  08/09/2021    Influenza Vaccine (1) Never done         The patient has no Health Maintenance topics of status Not Due    No future appointments.         Signature:  THAD Cobb  [facility]  [address] [facility] [address]  [phone]    Date of encounter: 9/25/23

## 2023-09-26 NOTE — ASSESSMENT & PLAN NOTE
Reports she had a spell just prior to arrival where she felt like she may pass out with blurry vision/tired eyes and diaphoresis that has resolved. Reports that she ate sugar coated snack prior to event.

## 2023-10-04 ENCOUNTER — OFFICE VISIT (OUTPATIENT)
Dept: FAMILY MEDICINE | Facility: CLINIC | Age: 28
End: 2023-10-04
Payer: COMMERCIAL

## 2023-10-04 VITALS
OXYGEN SATURATION: 99 % | WEIGHT: 112 LBS | BODY MASS INDEX: 21.14 KG/M2 | DIASTOLIC BLOOD PRESSURE: 66 MMHG | HEIGHT: 61 IN | SYSTOLIC BLOOD PRESSURE: 115 MMHG | HEART RATE: 74 BPM | TEMPERATURE: 98 F | RESPIRATION RATE: 20 BRPM

## 2023-10-04 DIAGNOSIS — N92.6 IRREGULAR MENSES: Primary | ICD-10-CM

## 2023-10-04 DIAGNOSIS — F41.9 ANXIETY: ICD-10-CM

## 2023-10-04 DIAGNOSIS — Z13.31 POSITIVE DEPRESSION SCREENING: ICD-10-CM

## 2023-10-04 DIAGNOSIS — F32.A DEPRESSION, UNSPECIFIED DEPRESSION TYPE: ICD-10-CM

## 2023-10-04 PROBLEM — Z86.2 HISTORY OF ANEMIA: Status: RESOLVED | Noted: 2023-09-26 | Resolved: 2023-10-04

## 2023-10-04 PROBLEM — R61 DIAPHORESIS: Status: RESOLVED | Noted: 2023-09-26 | Resolved: 2023-10-04

## 2023-10-04 LAB
ALBUMIN SERPL BCP-MCNC: 3.7 G/DL (ref 3.5–5)
ALBUMIN/GLOB SERPL: 1.4 {RATIO}
ALP SERPL-CCNC: 52 U/L (ref 37–98)
ALT SERPL W P-5'-P-CCNC: 27 U/L (ref 13–56)
ANION GAP SERPL CALCULATED.3IONS-SCNC: 9 MMOL/L (ref 7–16)
AST SERPL W P-5'-P-CCNC: 13 U/L (ref 15–37)
BILIRUB SERPL-MCNC: 0.4 MG/DL (ref ?–1.2)
BUN SERPL-MCNC: 15 MG/DL (ref 7–18)
BUN/CREAT SERPL: 18 (ref 6–20)
CALCIUM SERPL-MCNC: 8.7 MG/DL (ref 8.5–10.1)
CHLORIDE SERPL-SCNC: 108 MMOL/L (ref 98–107)
CO2 SERPL-SCNC: 26 MMOL/L (ref 21–32)
CREAT SERPL-MCNC: 0.83 MG/DL (ref 0.55–1.02)
EGFR (NO RACE VARIABLE) (RUSH/TITUS): 98 ML/MIN/1.73M2
EST. AVERAGE GLUCOSE BLD GHB EST-MCNC: 81 MG/DL
GLOBULIN SER-MCNC: 2.6 G/DL (ref 2–4)
GLUCOSE SERPL-MCNC: 102 MG/DL (ref 74–106)
HBA1C MFR BLD HPLC: 5 % (ref 4.5–6.6)
POTASSIUM SERPL-SCNC: 4.2 MMOL/L (ref 3.5–5.1)
PROT SERPL-MCNC: 6.3 G/DL (ref 6.4–8.2)
SODIUM SERPL-SCNC: 139 MMOL/L (ref 136–145)
TSH SERPL DL<=0.005 MIU/L-ACNC: 0.45 UIU/ML (ref 0.36–3.74)

## 2023-10-04 PROCEDURE — 1159F PR MEDICATION LIST DOCUMENTED IN MEDICAL RECORD: ICD-10-PCS | Mod: CPTII,,, | Performed by: NURSE PRACTITIONER

## 2023-10-04 PROCEDURE — 99213 OFFICE O/P EST LOW 20 MIN: CPT | Mod: ,,, | Performed by: NURSE PRACTITIONER

## 2023-10-04 PROCEDURE — 3078F PR MOST RECENT DIASTOLIC BLOOD PRESSURE < 80 MM HG: ICD-10-PCS | Mod: CPTII,,, | Performed by: NURSE PRACTITIONER

## 2023-10-04 PROCEDURE — 80053 COMPREHENSIVE METABOLIC PANEL: ICD-10-PCS | Mod: ,,, | Performed by: CLINICAL MEDICAL LABORATORY

## 2023-10-04 PROCEDURE — 3074F SYST BP LT 130 MM HG: CPT | Mod: CPTII,,, | Performed by: NURSE PRACTITIONER

## 2023-10-04 PROCEDURE — 3008F PR BODY MASS INDEX (BMI) DOCUMENTED: ICD-10-PCS | Mod: CPTII,,, | Performed by: NURSE PRACTITIONER

## 2023-10-04 PROCEDURE — 3008F BODY MASS INDEX DOCD: CPT | Mod: CPTII,,, | Performed by: NURSE PRACTITIONER

## 2023-10-04 PROCEDURE — 84443 TSH: ICD-10-PCS | Mod: ,,, | Performed by: CLINICAL MEDICAL LABORATORY

## 2023-10-04 PROCEDURE — 80053 COMPREHEN METABOLIC PANEL: CPT | Mod: ,,, | Performed by: CLINICAL MEDICAL LABORATORY

## 2023-10-04 PROCEDURE — 83036 HEMOGLOBIN A1C: ICD-10-PCS | Mod: ,,, | Performed by: CLINICAL MEDICAL LABORATORY

## 2023-10-04 PROCEDURE — 99213 PR OFFICE/OUTPT VISIT, EST, LEVL III, 20-29 MIN: ICD-10-PCS | Mod: ,,, | Performed by: NURSE PRACTITIONER

## 2023-10-04 PROCEDURE — 83036 HEMOGLOBIN GLYCOSYLATED A1C: CPT | Mod: ,,, | Performed by: CLINICAL MEDICAL LABORATORY

## 2023-10-04 PROCEDURE — 1159F MED LIST DOCD IN RCRD: CPT | Mod: CPTII,,, | Performed by: NURSE PRACTITIONER

## 2023-10-04 PROCEDURE — 84443 ASSAY THYROID STIM HORMONE: CPT | Mod: ,,, | Performed by: CLINICAL MEDICAL LABORATORY

## 2023-10-04 PROCEDURE — 3078F DIAST BP <80 MM HG: CPT | Mod: CPTII,,, | Performed by: NURSE PRACTITIONER

## 2023-10-04 PROCEDURE — 3074F PR MOST RECENT SYSTOLIC BLOOD PRESSURE < 130 MM HG: ICD-10-PCS | Mod: CPTII,,, | Performed by: NURSE PRACTITIONER

## 2023-10-04 RX ORDER — BUPROPION HYDROCHLORIDE 150 MG/1
TABLET ORAL
Qty: 21 TABLET | Refills: 0 | Status: SHIPPED | OUTPATIENT
Start: 2023-10-04 | End: 2023-10-16 | Stop reason: SDUPTHER

## 2023-10-04 NOTE — PROGRESS NOTES
Emely Hanson, THAD   [facility]      PATIENT NAME: Malinda Sepulveda  : 1995  DATE: 10/4/23  MRN: [id number]      Patient PCP Information       Provider PCP Type    Primary Doctor No General            Reason for Visit / Chief Complaint: Anxiety (Patient presents to the clinic for anxiety also wants to est care /Rm2), Medication Refill, and Establish Care       History of Present Illness / Problem Focused Workflow     Malinda Sepulveda presents to the clinic with Anxiety (Patient presents to the clinic for anxiety also wants to est care /Rm2), Medication Refill, and Establish Care     HPI  Ms. Sepulveda presents to clinic for evaluation for anxiety. Patient states she was diagnosed with manic anxiety when she was teen. Patient states she has previously taken zoloft, lexapro and wellbutrin.   Patient states she stopped antidepressant when she began having children. Patient has been off medication for several years. Patient has noticed increased anxiety of recent. 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  Patient reports recent incident while driving where she became extremely nauseated followed by vision loss for 30 seconds, diaphoresis. Patient states she pulled over called mom. Symptoms improved.  Patient reports unclear etiology to what caused episode however was concerned may have been panic attack. Patient denies prior panic attacks every with anxiety in past.   Patient does reports increased moodiness, poor sleep.   Patient went to [facility] after episode. CBC and A1C ordered. CBC stable. A1C not collected.   Patient had irregular cycle last month with heavy period.   Patient urine preg and UDS negative.    with prior vasectomy.       Review of Systems     Review of Systems   Constitutional:  Negative for activity change, appetite change, chills, diaphoresis, fatigue, fever and unexpected weight change.   HENT:  Negative for congestion, ear pain, facial swelling, hearing loss, nosebleeds  and sore throat.    Eyes: Negative.    Respiratory:  Negative for apnea, cough, shortness of breath and wheezing.    Cardiovascular:  Negative for chest pain, palpitations and leg swelling.   Gastrointestinal:  Negative for abdominal distention, abdominal pain, blood in stool, constipation, diarrhea and nausea.   Endocrine: Negative for cold intolerance, heat intolerance, polydipsia, polyphagia and polyuria.   Genitourinary:  Negative for decreased urine volume, difficulty urinating, dysuria, flank pain, frequency, hematuria and urgency.   Musculoskeletal:  Negative for arthralgias, joint swelling and myalgias.   Skin:  Negative for color change and rash.   Allergic/Immunologic: Negative.    Neurological:  Negative for dizziness, tremors, seizures, syncope, facial asymmetry, speech difficulty, weakness, light-headedness, numbness and headaches.   Hematological:  Negative for adenopathy. Does not bruise/bleed easily.   Psychiatric/Behavioral:  Positive for agitation and sleep disturbance. Negative for behavioral problems and confusion. The patient is nervous/anxious.        Medical / Social / Family History   History reviewed. No pertinent past medical history.    Past Surgical History:   Procedure Laterality Date    MANDIBLE FRACTURE SURGERY         Social History  Ms.  reports that she has been smoking vaping with nicotine. She has never used smokeless tobacco. She reports current alcohol use. She reports that she does not use drugs.    Family History  Ms.'s family history includes Cancer in her maternal grandfather; Heart failure in her maternal grandmother; Stroke in her maternal grandmother.    Medications and Allergies     Medications  Outpatient Medications Marked as Taking for the 10/4/23 encounter (Office Visit) with Emely Hanson FNP   Medication Sig Dispense Refill    clobetasoL (OLUX) 0.05 % Foam Apply topically.      ketoconazole (NIZORAL) 2 % shampoo SMARTSIG:Topical 2-3 Times Weekly    "      Allergies  Review of patient's allergies indicates:  No Known Allergies    Physical Examination     Vitals:    10/04/23 0858   BP: 115/66   BP Location: Right arm   Patient Position: Sitting   BP Method: Small (Automatic)   Pulse: 74   Resp: 20   Temp: 98 °F (36.7 °C)   TempSrc: Oral   SpO2: 99%   Weight: 50.8 kg (112 lb)   Height: 5' 1" (1.549 m)   Over the last two weeks how often have you been bothered by little interest or pleasure in doing things: 2  Over the last two weeks how often have you been bothered by feeling down, depressed or hopeless: 1  PHQ-2 Total Score: 3  PHQ-9 Score: 13  PHQ-9 Interpretation: Moderate  I have used clinical judgement based on duration and functional status to consider definite necessity for treatment.     Physical Exam  Vitals reviewed.   Constitutional:       Appearance: Normal appearance.   HENT:      Head: Normocephalic.      Right Ear: External ear normal.      Left Ear: External ear normal.      Nose: Nose normal.      Mouth/Throat:      Mouth: Mucous membranes are moist.   Eyes:      Extraocular Movements: Extraocular movements intact.   Neck:      Vascular: No carotid bruit.   Cardiovascular:      Rate and Rhythm: Normal rate and regular rhythm.      Pulses: Normal pulses.      Heart sounds: Normal heart sounds.   Pulmonary:      Effort: Pulmonary effort is normal.      Breath sounds: Normal breath sounds.   Abdominal:      Palpations: Abdomen is soft.   Musculoskeletal:         General: Normal range of motion.      Cervical back: Normal range of motion.   Skin:     General: Skin is warm and dry.      Capillary Refill: Capillary refill takes less than 2 seconds.   Neurological:      General: No focal deficit present.      Mental Status: She is alert and oriented to person, place, and time.   Psychiatric:         Mood and Affect: Mood normal.         Behavior: Behavior normal.         Thought Content: Thought content normal.         Judgment: Judgment normal. "           Office Visit on 09/25/2023   Component Date Value Ref Range Status    POC Amphetamines 09/25/2023 Negative  Negative, Inconclusive Final    POC Barbiturates 09/25/2023 Negative  Negative, Inconclusive Final    POC Benzodiazepines 09/25/2023 Negative  Negative, Inconclusive Final    POC Cocaine 09/25/2023 Negative  Negative, Inconclusive Final    POC THC 09/25/2023 Negative  Negative, Inconclusive Final    POC Methadone 09/25/2023 Negative  Negative, Inconclusive Final    POC Methamphetamine 09/25/2023 Negative  Negative, Inconclusive Final    POC Opiates 09/25/2023 Negative  Negative, Inconclusive Final    POC Oxycodone 09/25/2023 Negative  Negative, Inconclusive Final    POC Phencyclidine 09/25/2023 Negative  Negative, Inconclusive Final    POC Methylenedioxymethamphetamine * 09/25/2023 Negative  Negative, Inconclusive Final    POC Tricyclic Antidepressants 09/25/2023 Negative  Negative, Inconclusive Final    POC Buprenorphine 09/25/2023 Negative   Final     Acceptable 09/25/2023 Yes   Final    POC Temperature (Urine) 09/25/2023 94   Final    POC Preg Test, Ur 09/25/2023 Negative  Negative Final     Acceptable 09/25/2023 Yes   Final    WBC 09/25/2023 7.27  4.50 - 11.00 K/uL Final    RBC 09/25/2023 4.54  4.20 - 5.40 M/uL Final    Hemoglobin 09/25/2023 14.3  12.0 - 16.0 g/dL Final    Hematocrit 09/25/2023 41.8  38.0 - 47.0 % Final    MCV 09/25/2023 92.1  80.0 - 96.0 fL Final    MCH 09/25/2023 31.5 (H)  27.0 - 31.0 pg Final    MCHC 09/25/2023 34.2  32.0 - 36.0 g/dL Final    RDW 09/25/2023 11.7  11.5 - 14.5 % Final    Platelet Count 09/25/2023 203  150 - 400 K/uL Final    MPV 09/25/2023 12.2  9.4 - 12.4 fL Final    Neutrophils % 09/25/2023 82.1 (H)  53.0 - 65.0 % Final    Lymphocytes % 09/25/2023 11.0 (L)  27.0 - 41.0 % Final    Monocytes % 09/25/2023 5.4  2.0 - 6.0 % Final    Eosinophils % 09/25/2023 0.6 (L)  1.0 - 4.0 % Final    Basophils % 09/25/2023 0.6  0.0 - 1.0 % Final     Immature Granulocytes % 09/25/2023 0.3  0.0 - 0.4 % Final    nRBC, Auto 09/25/2023 0.0  <=0.0 % Final    Neutrophils, Abs 09/25/2023 5.98  1.80 - 7.70 K/uL Final    Lymphocytes, Absolute 09/25/2023 0.80 (L)  1.00 - 4.80 K/uL Final    Monocytes, Absolute 09/25/2023 0.39  0.00 - 0.80 K/uL Final    Eosinophils, Absolute 09/25/2023 0.04  0.00 - 0.50 K/uL Final    Basophils, Absolute 09/25/2023 0.04  0.00 - 0.20 K/uL Final    Immature Granulocytes, Absolute 09/25/2023 0.02  0.00 - 0.04 K/uL Final    nRBC, Absolute 09/25/2023 0.00  <=0.00 x10e3/uL Final    Diff Type 09/25/2023 Auto   Final             Assessment and Plan (including Health Maintenance)         Plan:   Irregular menses    Depression, unspecified depression type  -     buPROPion (WELLBUTRIN XL) 150 MG TB24 tablet; Take 1 tablet (150 mg total) by mouth once daily for 7 days, THEN 2 tablets (300 mg total) once daily for 7 days.  Dispense: 21 tablet; Refill: 0    Anxiety  -     buPROPion (WELLBUTRIN XL) 150 MG TB24 tablet; Take 1 tablet (150 mg total) by mouth once daily for 7 days, THEN 2 tablets (300 mg total) once daily for 7 days.  Dispense: 21 tablet; Refill: 0  -     TSH; Future; Expected date: 10/04/2023  -     Comprehensive Metabolic Panel; Future; Expected date: 10/04/2023    Positive depression screening  Comments:  I have reviewed the positive depression score with the patient and found that no additional intervention is needed at this time.     RTC in 1 mo for wellness and follow up depression/anxiety evaluation   There are no Patient Instructions on file for this visit.       Health Maintenance Due   Topic Date Due    Hepatitis C Screening  Never done    Lipid Panel  Never done    COVID-19 Vaccine (1) Never done    Pneumococcal Vaccines (Age 0-64) (1 - PCV) Never done    HIV Screening  Never done    TETANUS VACCINE  Never done    Pap Smear  08/09/2021    Influenza Vaccine (1) Never done         There is no immunization history on file for this  patient.     Problem List Items Addressed This Visit          Renal/    Irregular menses - Primary     Other Visit Diagnoses       Depression, unspecified depression type        Relevant Medications    buPROPion (WELLBUTRIN XL) 150 MG TB24 tablet    Anxiety        Relevant Medications    buPROPion (WELLBUTRIN XL) 150 MG TB24 tablet    Other Relevant Orders    TSH    Comprehensive Metabolic Panel    Positive depression screening        I have reviewed the positive depression score with the patient and found that no additional intervention is needed at this time.            The patient has no Health Maintenance topics of status Not Due    Future Appointments   Date Time Provider Department Center   11/8/2023  8:15 AM Emely Hanson FNP [facility]            Signature:  THAD Disla  [facility]     Date of encounter: 10/4/23

## 2023-10-05 ENCOUNTER — PATIENT OUTREACH (OUTPATIENT)
Dept: ADMINISTRATIVE | Facility: HOSPITAL | Age: 28
End: 2023-10-05

## 2023-10-05 LAB
PAP RECOMMENDATION EXT: NORMAL
PAP SMEAR: NORMAL

## 2023-10-05 NOTE — PROGRESS NOTES
10/05/2023   Chart accessed for: Cervical Cancer Screening  Care Gaps addressed: All  Topics  Outreach made to patient via: Chart Review/Record Upload     Care Everywhere Updated and Reviewed.   Reviewed.  [facility] Reviewed. Abstracted January 2022 Pap.  Immunization Database (Immprint/MIXX) Reviewed. Vaccinations uploaded: N/A    HM Updated with January 2022 Pap.  Next appointment: 11/08/23.  Appointment notes updated to include: Due for Lipid Panel.    Care Team and Medical History Updated.      Health Maintenance Due   Topic Date Due    Hepatitis C Screening  Never done    Lipid Panel  Never done    COVID-19 Vaccine (1) Never done    Pneumococcal Vaccines (Age 0-64) (1 - PCV) Never done    HIV Screening  Never done    TETANUS VACCINE  Never done    Influenza Vaccine (1) Never done

## 2023-10-16 ENCOUNTER — PATIENT MESSAGE (OUTPATIENT)
Dept: FAMILY MEDICINE | Facility: CLINIC | Age: 28
End: 2023-10-16
Payer: COMMERCIAL

## 2023-10-16 DIAGNOSIS — F41.9 ANXIETY: ICD-10-CM

## 2023-10-16 DIAGNOSIS — F32.A DEPRESSION, UNSPECIFIED DEPRESSION TYPE: ICD-10-CM

## 2023-10-16 RX ORDER — BUPROPION HYDROCHLORIDE 150 MG/1
300 TABLET ORAL DAILY
Qty: 180 TABLET | Refills: 3 | Status: SHIPPED | OUTPATIENT
Start: 2023-10-16 | End: 2024-10-10

## 2023-11-08 ENCOUNTER — OFFICE VISIT (OUTPATIENT)
Dept: FAMILY MEDICINE | Facility: CLINIC | Age: 28
End: 2023-11-08
Payer: COMMERCIAL

## 2023-11-08 VITALS
TEMPERATURE: 98 F | HEIGHT: 61 IN | WEIGHT: 113 LBS | OXYGEN SATURATION: 99 % | BODY MASS INDEX: 21.34 KG/M2 | DIASTOLIC BLOOD PRESSURE: 77 MMHG | SYSTOLIC BLOOD PRESSURE: 112 MMHG | RESPIRATION RATE: 20 BRPM | HEART RATE: 81 BPM

## 2023-11-08 DIAGNOSIS — Z13.220 SCREENING FOR LIPID DISORDERS: ICD-10-CM

## 2023-11-08 DIAGNOSIS — F17.290 VAPING NICOTINE DEPENDENCE, TOBACCO PRODUCT: ICD-10-CM

## 2023-11-08 DIAGNOSIS — Z00.00 ROUTINE GENERAL MEDICAL EXAMINATION AT A HEALTH CARE FACILITY: Primary | ICD-10-CM

## 2023-11-08 DIAGNOSIS — Z11.59 NEED FOR HEPATITIS C SCREENING TEST: ICD-10-CM

## 2023-11-08 DIAGNOSIS — Z11.4 SCREENING FOR HIV (HUMAN IMMUNODEFICIENCY VIRUS): ICD-10-CM

## 2023-11-08 LAB
CHOLEST SERPL-MCNC: 151 MG/DL (ref 0–200)
CHOLEST/HDLC SERPL: 2.6 {RATIO}
HCV AB SER QL: NORMAL
HDLC SERPL-MCNC: 59 MG/DL (ref 40–60)
HIV 1+O+2 AB SERPL QL: NORMAL
LDLC SERPL CALC-MCNC: 80 MG/DL
LDLC/HDLC SERPL: 1.4 {RATIO}
NONHDLC SERPL-MCNC: 92 MG/DL
TRIGL SERPL-MCNC: 61 MG/DL (ref 35–150)
VLDLC SERPL-MCNC: 12 MG/DL

## 2023-11-08 PROCEDURE — 1159F MED LIST DOCD IN RCRD: CPT | Mod: CPTII,,, | Performed by: NURSE PRACTITIONER

## 2023-11-08 PROCEDURE — 1160F PR REVIEW ALL MEDS BY PRESCRIBER/CLIN PHARMACIST DOCUMENTED: ICD-10-PCS | Mod: CPTII,,, | Performed by: NURSE PRACTITIONER

## 2023-11-08 PROCEDURE — 99395 PREV VISIT EST AGE 18-39: CPT | Mod: ,,, | Performed by: NURSE PRACTITIONER

## 2023-11-08 PROCEDURE — 3044F HG A1C LEVEL LT 7.0%: CPT | Mod: CPTII,,, | Performed by: NURSE PRACTITIONER

## 2023-11-08 PROCEDURE — 80061 LIPID PANEL: CPT | Mod: ,,, | Performed by: CLINICAL MEDICAL LABORATORY

## 2023-11-08 PROCEDURE — 80061 LIPID PANEL: ICD-10-PCS | Mod: ,,, | Performed by: CLINICAL MEDICAL LABORATORY

## 2023-11-08 PROCEDURE — 3008F PR BODY MASS INDEX (BMI) DOCUMENTED: ICD-10-PCS | Mod: CPTII,,, | Performed by: NURSE PRACTITIONER

## 2023-11-08 PROCEDURE — 3044F PR MOST RECENT HEMOGLOBIN A1C LEVEL <7.0%: ICD-10-PCS | Mod: CPTII,,, | Performed by: NURSE PRACTITIONER

## 2023-11-08 PROCEDURE — 86803 HEPATITIS C ANTIBODY: ICD-10-PCS | Mod: ,,, | Performed by: CLINICAL MEDICAL LABORATORY

## 2023-11-08 PROCEDURE — 1159F PR MEDICATION LIST DOCUMENTED IN MEDICAL RECORD: ICD-10-PCS | Mod: CPTII,,, | Performed by: NURSE PRACTITIONER

## 2023-11-08 PROCEDURE — 3078F PR MOST RECENT DIASTOLIC BLOOD PRESSURE < 80 MM HG: ICD-10-PCS | Mod: CPTII,,, | Performed by: NURSE PRACTITIONER

## 2023-11-08 PROCEDURE — 1160F RVW MEDS BY RX/DR IN RCRD: CPT | Mod: CPTII,,, | Performed by: NURSE PRACTITIONER

## 2023-11-08 PROCEDURE — 87389 HIV-1 AG W/HIV-1&-2 AB AG IA: CPT | Mod: ,,, | Performed by: CLINICAL MEDICAL LABORATORY

## 2023-11-08 PROCEDURE — 3074F PR MOST RECENT SYSTOLIC BLOOD PRESSURE < 130 MM HG: ICD-10-PCS | Mod: CPTII,,, | Performed by: NURSE PRACTITIONER

## 2023-11-08 PROCEDURE — 3078F DIAST BP <80 MM HG: CPT | Mod: CPTII,,, | Performed by: NURSE PRACTITIONER

## 2023-11-08 PROCEDURE — 87389 HIV 1 / 2 ANTIBODY: ICD-10-PCS | Mod: ,,, | Performed by: CLINICAL MEDICAL LABORATORY

## 2023-11-08 PROCEDURE — 3074F SYST BP LT 130 MM HG: CPT | Mod: CPTII,,, | Performed by: NURSE PRACTITIONER

## 2023-11-08 PROCEDURE — 99395 PR PREVENTIVE VISIT,EST,18-39: ICD-10-PCS | Mod: ,,, | Performed by: NURSE PRACTITIONER

## 2023-11-08 PROCEDURE — 86803 HEPATITIS C AB TEST: CPT | Mod: ,,, | Performed by: CLINICAL MEDICAL LABORATORY

## 2023-11-08 PROCEDURE — 3008F BODY MASS INDEX DOCD: CPT | Mod: CPTII,,, | Performed by: NURSE PRACTITIONER

## 2023-11-08 NOTE — PROGRESS NOTES
THAD Disla        PATIENT NAME: Malinda Sepulveda  : 1995  DATE: 23  MRN: [id number]      Patient PCP Information       Provider PCP Type    Primary Doctor No General          Chief Complaint      Chief Complaint   Patient presents with    wellness     Patient presents to the clinic wellness (r)  Rm1         History of Present Illness        Malinda Sepulveda is a 27 y.o. female who presents for routine wellness visit. Pt states she is doing well with no acute complaints. Pt denies FHx of colon, or cervical cancer. Maternal aunt with hx of breast cancer. Last colonoscopy was never with 45 year recs. Last mammogram was 38, denies hx of mammogram. Does have breast implants when screening due. Last pap was 2022 normal repeat in 3 years. Declines flu shot. Vaping with nicotene. Working on improving diet and exercise.    Past Medical History:  Past Medical History:   Diagnosis Date    Pap smear for cervical cancer screening 2022    Dr. Elvie Bolanos - [facility]       Past Surgical History:   has a past surgical history that includes Mandible fracture surgery.    Social History:  Social History     Tobacco Use    Smoking status: Every Day     Types: Vaping with nicotine    Smokeless tobacco: Never   Substance Use Topics    Alcohol use: Yes    Drug use: Never       I personally reviewed all past medical, surgical, and social.     Review of Systems   Constitutional:  Negative for activity change, appetite change, chills, diaphoresis, fatigue, fever and unexpected weight change.   HENT:  Negative for congestion, ear pain, facial swelling, hearing loss, nosebleeds and sore throat.    Eyes: Negative.    Respiratory:  Negative for apnea, cough, shortness of breath and wheezing.    Cardiovascular:  Negative for chest pain, palpitations and leg swelling.   Gastrointestinal:  Negative for abdominal distention, abdominal pain, blood in stool, constipation, diarrhea and nausea.  "  Endocrine: Negative for cold intolerance, heat intolerance, polydipsia, polyphagia and polyuria.   Genitourinary:  Negative for decreased urine volume, difficulty urinating, dysuria, flank pain, frequency, hematuria and urgency.   Musculoskeletal:  Negative for arthralgias, joint swelling and myalgias.   Skin:  Negative for color change and rash.   Allergic/Immunologic: Negative.    Neurological:  Negative for dizziness, tremors, seizures, syncope, facial asymmetry, speech difficulty, weakness, light-headedness, numbness and headaches.   Hematological:  Negative for adenopathy. Does not bruise/bleed easily.   Psychiatric/Behavioral:  Negative for behavioral problems and confusion.         Medications:  Outpatient Encounter Medications as of 11/8/2023   Medication Sig Dispense Refill    buPROPion (WELLBUTRIN XL) 150 MG TB24 tablet Take 2 tablets (300 mg total) by mouth once daily. 180 tablet 3    clobetasoL (OLUX) 0.05 % Foam Apply topically.      ketoconazole (NIZORAL) 2 % shampoo SMARTSIG:Topical 2-3 Times Weekly      [DISCONTINUED] buPROPion (WELLBUTRIN XL) 150 MG TB24 tablet Take 1 tablet (150 mg total) by mouth once daily for 7 days, THEN 2 tablets (300 mg total) once daily for 7 days. 21 tablet 0     No facility-administered encounter medications on file as of 11/8/2023.       Allergies:  Review of patient's allergies indicates:  No Known Allergies    Health Maintenance:    There is no immunization history on file for this patient.   Health Maintenance   Topic Date Due    Hepatitis C Screening  Never done    Lipid Panel  Never done    TETANUS VACCINE  Never done    Pap Smear  01/26/2025        Physical Exam      Vital Signs  Temp: 98 °F (36.7 °C)  Temp Source: Oral  Pulse: 81  Resp: 20  SpO2: 99 %  BP: 112/77  BP Location: Right arm  Patient Position: Sitting  Pain Score: 0-No pain  Height and Weight  Height: 5' 1" (154.9 cm)  Weight: 51.3 kg (113 lb)  BSA (Calculated - sq m): 1.49 sq meters  BMI " (Calculated): 21.4  Weight in (lb) to have BMI = 25: 132]  Over the last two weeks how often have you been bothered by little interest or pleasure in doing things: 0  Over the last two weeks how often have you been bothered by feeling down, depressed or hopeless: 0  PHQ-2 Total Score: 0  PHQ-9 Score: 1  PHQ-9 Interpretation: Minimal or None      Physical Exam  Vitals and nursing note reviewed.   Constitutional:       Appearance: Normal appearance. She is normal weight.   HENT:      Head: Normocephalic.      Right Ear: External ear normal.      Left Ear: External ear normal.      Nose: Nose normal.      Mouth/Throat:      Mouth: Mucous membranes are moist.   Eyes:      Extraocular Movements: Extraocular movements intact.      Conjunctiva/sclera: Conjunctivae normal.      Pupils: Pupils are equal, round, and reactive to light.   Cardiovascular:      Rate and Rhythm: Normal rate and regular rhythm.      Pulses: Normal pulses.      Heart sounds: Normal heart sounds. No murmur heard.  Pulmonary:      Effort: Pulmonary effort is normal.      Breath sounds: Normal breath sounds. No stridor. No wheezing or rhonchi.   Abdominal:      General: Bowel sounds are normal. There is no distension.      Palpations: Abdomen is soft. There is no mass.      Tenderness: There is no abdominal tenderness.   Musculoskeletal:         General: No swelling or tenderness. Normal range of motion.      Cervical back: Normal range of motion and neck supple.      Right lower leg: No edema.      Left lower leg: No edema.   Skin:     General: Skin is warm and dry.      Capillary Refill: Capillary refill takes less than 2 seconds.   Neurological:      General: No focal deficit present.      Mental Status: She is alert and oriented to person, place, and time. Mental status is at baseline.      Cranial Nerves: No cranial nerve deficit.      Sensory: No sensory deficit.      Motor: No weakness.   Psychiatric:         Mood and Affect: Mood normal.          Behavior: Behavior normal.         Thought Content: Thought content normal.         Judgment: Judgment normal.            Is patient >64 yo of age?       No data to display                  Laboratory:  CBC:  Recent Labs   Lab 09/25/23  1659   WBC 7.27   RBC 4.54   Hemoglobin 14.3   Hematocrit 41.8   Platelet Count 203   MCV 92.1   MCH 31.5 H   MCHC 34.2     CMP:  Recent Labs   Lab 10/04/23  1511   Glucose 102   Calcium 8.7   Albumin 3.7   Total Protein 6.3 L   Sodium 139   Potassium 4.2   CO2 26   Chloride 108 H   BUN 15   Alk Phos 52   ALT 27   AST 13 L   Bilirubin, Total 0.4     LIPIDS:  Recent Labs   Lab 10/04/23  1511   TSH 0.454     TSH:  Recent Labs   Lab 10/04/23  1511   TSH 0.454     A1C:  Recent Labs   Lab 10/04/23  0929   Hemoglobin A1C 5.0       Assessment/Plan     Malinda Sepulveda is a 27 y.o.female with:     1. Routine general medical examination at a health care facility  - HIV 1/2 Ag/Ab (4th Gen); Future  - Hepatitis C Antibody; Future  - Lipid Panel; Future    2. Vaping nicotine dependence, tobacco product    3. Screening for lipid disorders  - Lipid Panel; Future    4. Screening for HIV (human immunodeficiency virus)  - HIV 1/2 Ag/Ab (4th Gen); Future    5. Need for hepatitis C screening test  - Hepatitis C Antibody; Future     Declines flu vaccine    Total time spent face-to-face and non-face-to-face coordinating care for this encounter was: >30 min    Chronic conditions status updated as per HPI.  Other than changes above, cont current medications and maintain follow up with specialists.  Return to clinic 1 year next wellness.    Emely HAQUE

## 2024-05-20 ENCOUNTER — OFFICE VISIT (OUTPATIENT)
Dept: FAMILY MEDICINE | Facility: CLINIC | Age: 29
End: 2024-05-20
Payer: COMMERCIAL

## 2024-05-20 VITALS
RESPIRATION RATE: 20 BRPM | HEART RATE: 66 BPM | DIASTOLIC BLOOD PRESSURE: 65 MMHG | WEIGHT: 111.81 LBS | OXYGEN SATURATION: 100 % | BODY MASS INDEX: 21.11 KG/M2 | HEIGHT: 61 IN | TEMPERATURE: 98 F | SYSTOLIC BLOOD PRESSURE: 100 MMHG

## 2024-05-20 DIAGNOSIS — J02.9 SORE THROAT: Primary | ICD-10-CM

## 2024-05-20 DIAGNOSIS — J04.0 LARYNGITIS: ICD-10-CM

## 2024-05-20 LAB
CTP QC/QA: YES
MOLECULAR STREP A: NEGATIVE

## 2024-05-20 PROCEDURE — 3078F DIAST BP <80 MM HG: CPT | Mod: CPTII,,, | Performed by: NURSE PRACTITIONER

## 2024-05-20 PROCEDURE — 1160F RVW MEDS BY RX/DR IN RCRD: CPT | Mod: CPTII,,, | Performed by: NURSE PRACTITIONER

## 2024-05-20 PROCEDURE — 3074F SYST BP LT 130 MM HG: CPT | Mod: CPTII,,, | Performed by: NURSE PRACTITIONER

## 2024-05-20 PROCEDURE — 87651 STREP A DNA AMP PROBE: CPT | Mod: QW,,, | Performed by: NURSE PRACTITIONER

## 2024-05-20 PROCEDURE — 96372 THER/PROPH/DIAG INJ SC/IM: CPT | Mod: ,,, | Performed by: NURSE PRACTITIONER

## 2024-05-20 PROCEDURE — 1159F MED LIST DOCD IN RCRD: CPT | Mod: CPTII,,, | Performed by: NURSE PRACTITIONER

## 2024-05-20 PROCEDURE — 99213 OFFICE O/P EST LOW 20 MIN: CPT | Mod: 25,,, | Performed by: NURSE PRACTITIONER

## 2024-05-20 PROCEDURE — 3008F BODY MASS INDEX DOCD: CPT | Mod: CPTII,,, | Performed by: NURSE PRACTITIONER

## 2024-05-20 RX ORDER — BENZONATATE 100 MG/1
100 CAPSULE ORAL 3 TIMES DAILY PRN
Qty: 30 CAPSULE | Refills: 0 | Status: SHIPPED | OUTPATIENT
Start: 2024-05-20 | End: 2024-05-30

## 2024-05-20 RX ORDER — DEXAMETHASONE SODIUM PHOSPHATE 4 MG/ML
4 INJECTION, SOLUTION INTRA-ARTICULAR; INTRALESIONAL; INTRAMUSCULAR; INTRAVENOUS; SOFT TISSUE
Status: COMPLETED | OUTPATIENT
Start: 2024-05-20 | End: 2024-05-20

## 2024-05-20 RX ADMIN — DEXAMETHASONE SODIUM PHOSPHATE 4 MG: 4 INJECTION, SOLUTION INTRA-ARTICULAR; INTRALESIONAL; INTRAMUSCULAR; INTRAVENOUS; SOFT TISSUE at 08:05

## 2024-05-20 NOTE — LETTER
May 20, 2024    Malinda Sepulveda  [address] [place] [address]             [facility]  Family Medicine  [address] [place] [address] [address] [place] [address]  Phone: [phone]  Fax: [phone]   May 20, 2024     Patient: Malinda Sepulveda   YOB: 1995   Date of Visit: 5/20/2024       To Whom it May Concern:    Malinda Sepulveda was seen in my clinic on 5/20/2024. She may return to work on 5/21/2024 .    Please excuse her from any classes or work missed.    If you have any questions or concerns, please don't hesitate to call.    Sincerely,         Emely Hanson, BERENICEP

## 2024-05-20 NOTE — PROGRESS NOTES
THAD Disla        PATIENT NAME: Malinda Sepulveda  : 1995  DATE: 24  MRN: [id number]      Patient PCP Information       Provider PCP Type    Primary Doctor No General            Reason for Visit / Chief Complaint: Cough, Sore Throat (Started Thursday. ), and Hoarse       Update PCP  Update Chief Complaint         History of Present Illness / Problem Focused Workflow     Malinda Sepulveda presents to the clinic with Cough, Sore Throat (Started Thursday. ), and Hoarse     Cough  Associated symptoms include a sore throat.   Sore Throat   Associated symptoms include coughing.       Mrs Sepulveda presents to clinic with cough hoarseness and sore throat which began yesterday. Patient denies fever.     Medical / Social / Family History     Past Medical History:   Diagnosis Date    Pap smear for cervical cancer screening 2022    Dr. Elvie Bolanos - [facility]       Past Surgical History:   Procedure Laterality Date    MANDIBLE FRACTURE SURGERY         Social History  Ms.  reports that she has been smoking vaping with nicotine. She has been exposed to tobacco smoke. She has never used smokeless tobacco. She reports current alcohol use. She reports that she does not use drugs.    Family History  Ms.'s family history includes Cancer in her maternal grandfather; Heart failure in her maternal grandmother; Stroke in her maternal grandmother.    Medications and Allergies     Medications  Outpatient Medications Marked as Taking for the 24 encounter (Office Visit) with Emely Hanson FNP   Medication Sig Dispense Refill    buPROPion (WELLBUTRIN XL) 150 MG TB24 tablet Take 2 tablets (300 mg total) by mouth once daily. 180 tablet 3    clobetasoL (OLUX) 0.05 % Foam Apply topically.       Current Facility-Administered Medications for the 24 encounter (Office Visit) with Emely Hanson FNP   Medication Dose Route Frequency Provider Last Rate Last Admin    [COMPLETED] dexAMETHasone  "injection 4 mg  4 mg Intramuscular 1 time in Clinic/HOD Emely Hanson FNP   4 mg at 05/20/24 0825       Allergies  Review of patient's allergies indicates:  No Known Allergies    Physical Examination     Vitals:    05/20/24 0803   BP: 100/65   BP Location: Left arm   Patient Position: Sitting   BP Method: Medium (Automatic)   Pulse: 66   Resp: 20   Temp: 97.6 °F (36.4 °C)   TempSrc: Oral   SpO2: 100%   Weight: 50.7 kg (111 lb 12.8 oz)   Height: 5' 1" (1.549 m)       Physical Exam  Vitals reviewed.   Constitutional:       Appearance: Normal appearance.   HENT:      Head: Normocephalic.      Right Ear: External ear normal.      Left Ear: External ear normal.      Nose: Nose normal.      Mouth/Throat:      Mouth: Mucous membranes are moist.      Comments: hoarse  Eyes:      Extraocular Movements: Extraocular movements intact.   Cardiovascular:      Rate and Rhythm: Normal rate and regular rhythm.      Pulses: Normal pulses.      Heart sounds: Normal heart sounds.   Pulmonary:      Effort: Pulmonary effort is normal. No respiratory distress.      Breath sounds: Normal breath sounds. No stridor. No wheezing, rhonchi or rales.   Chest:      Chest wall: No tenderness.   Musculoskeletal:         General: Normal range of motion.      Cervical back: Normal range of motion.   Skin:     General: Skin is warm and dry.      Capillary Refill: Capillary refill takes less than 2 seconds.   Neurological:      General: No focal deficit present.      Mental Status: She is alert and oriented to person, place, and time.   Psychiatric:         Mood and Affect: Mood normal.         Behavior: Behavior normal.         Thought Content: Thought content normal.         Judgment: Judgment normal.           Office Visit on 05/20/2024   Component Date Value Ref Range Status    Molecular Strep A, POC 05/20/2024 Negative  Negative Final     Acceptable 05/20/2024 Yes   Final             Assessment and Plan (including Health " Maintenance)      Problem List  Smart Sets  Document Outside HM   :    Plan:     1. Sore throat  -     POCT Strep A, Molecular    2. Laryngitis  -     dexAMETHasone injection 4 mg  -     benzonatate (TESSALON) 100 MG capsule; Take 1 capsule (100 mg total) by mouth 3 (three) times daily as needed for Cough.  Dispense: 30 capsule; Refill: 0    Strep negative  Discussed viral nature and progression of illness  Tylenol and/or Motrin as needed for fever and fussiness  Increase fluids and monitor urine output  RTC if no improvement in 2-3 days    There are no Patient Instructions on file for this visit.       Health Maintenance Due   Topic Date Due    Pneumococcal Vaccines (Age 0-64) (1 of 2 - PCV) Never done    TETANUS VACCINE  Never done    COVID-19 Vaccine (1 - 2023-24 season) Never done         There is no immunization history on file for this patient.         Health Maintenance Topics with due status: Not Due       Topic Last Completion Date    Cervical Cancer Screening 01/26/2022    Influenza Vaccine Not Due       Future Appointments   Date Time Provider Department Center   5/23/2024 10:00 AM [facility]pe DO [facility]   11/13/2024  8:30 AM Emely Hanson FNP [facility]            Signature:  THAD Disla  [facility]     Date of encounter: 5/20/24

## 2024-10-17 ENCOUNTER — OFFICE VISIT (OUTPATIENT)
Dept: OBSTETRICS AND GYNECOLOGY | Facility: CLINIC | Age: 29
End: 2024-10-17
Payer: COMMERCIAL

## 2024-10-17 VITALS
HEIGHT: 61 IN | RESPIRATION RATE: 16 BRPM | WEIGHT: 111 LBS | HEART RATE: 65 BPM | BODY MASS INDEX: 20.96 KG/M2 | SYSTOLIC BLOOD PRESSURE: 111 MMHG | DIASTOLIC BLOOD PRESSURE: 61 MMHG

## 2024-10-17 DIAGNOSIS — Z01.419 ENCOUNTER FOR WELL WOMAN EXAM WITH ROUTINE GYNECOLOGICAL EXAM: Primary | ICD-10-CM

## 2024-10-17 PROCEDURE — 1159F MED LIST DOCD IN RCRD: CPT | Mod: CPTII,,, | Performed by: STUDENT IN AN ORGANIZED HEALTH CARE EDUCATION/TRAINING PROGRAM

## 2024-10-17 PROCEDURE — 3078F DIAST BP <80 MM HG: CPT | Mod: CPTII,,, | Performed by: STUDENT IN AN ORGANIZED HEALTH CARE EDUCATION/TRAINING PROGRAM

## 2024-10-17 PROCEDURE — 3074F SYST BP LT 130 MM HG: CPT | Mod: CPTII,,, | Performed by: STUDENT IN AN ORGANIZED HEALTH CARE EDUCATION/TRAINING PROGRAM

## 2024-10-17 PROCEDURE — 99999 PR PBB SHADOW E&M-EST. PATIENT-LVL III: CPT | Mod: PBBFAC,,, | Performed by: STUDENT IN AN ORGANIZED HEALTH CARE EDUCATION/TRAINING PROGRAM

## 2024-10-17 PROCEDURE — 99213 OFFICE O/P EST LOW 20 MIN: CPT | Mod: PBBFAC | Performed by: STUDENT IN AN ORGANIZED HEALTH CARE EDUCATION/TRAINING PROGRAM

## 2024-10-17 PROCEDURE — 99385 PREV VISIT NEW AGE 18-39: CPT | Mod: S$PBB,,, | Performed by: STUDENT IN AN ORGANIZED HEALTH CARE EDUCATION/TRAINING PROGRAM

## 2024-10-17 PROCEDURE — 3008F BODY MASS INDEX DOCD: CPT | Mod: CPTII,,, | Performed by: STUDENT IN AN ORGANIZED HEALTH CARE EDUCATION/TRAINING PROGRAM

## 2024-10-17 NOTE — PROGRESS NOTES
"    Subjective:       Malinda Sepulveda is a 28 y.o. female here for a routine exam.  Current complaints: none.  Personal health questionnaire reviewed: yes.     Gynecologic History  Patient's last menstrual period was 2024.  Contraception: vasectomy  Last Pap: 2022. Results were: normal    Obstetric History  OB History    Para Term  AB Living   2 2 2     2   SAB IAB Ectopic Multiple Live Births           2      # Outcome Date GA Lbr Javier/2nd Weight Sex Type Anes PTL Lv   2 Term 18   3.175 kg (7 lb) M Vag-Spont EPI N KANDIS   1 Term 16   3.175 kg (7 lb) M Vag-Spont EPI N KANDIS         The following portions of the patient's history were reviewed and updated as appropriate: allergies, current medications, past family history, past medical history, past social history, past surgical history, and problem list.    Review of Systems  Pertinent items are noted in HPI.      Objective:      /61   Pulse 65   Resp 16   Ht 5' 1" (1.549 m)   Wt 50.3 kg (111 lb)   LMP 2024   BMI 20.97 kg/m²   General appearance: alert, appears stated age, and cooperative  Lungs:  unlabored respirations  Breasts: normal appearance, no masses or tenderness, Inspection negative  Abdomen:  soft, nontender  Pelvic: cervix normal in appearance, external genitalia normal, no adnexal masses or tenderness, no cervical motion tenderness, uterus normal size, shape, and consistency, and vagina normal without discharge  Extremities: extremities normal, atraumatic, no cyanosis or edema  Skin: Skin color, texture, turgor normal. No rashes or lesions      Assessment:      Healthy female exam.      Plan:      Pap due   Return in one year for annual or sooner prn    "